# Patient Record
Sex: FEMALE | Race: WHITE
[De-identification: names, ages, dates, MRNs, and addresses within clinical notes are randomized per-mention and may not be internally consistent; named-entity substitution may affect disease eponyms.]

---

## 2017-02-13 ENCOUNTER — HOSPITAL ENCOUNTER (OUTPATIENT)
Dept: HOSPITAL 80 - BMCIMAGING | Age: 67
End: 2017-02-13
Attending: GENERAL ACUTE CARE HOSPITAL
Payer: COMMERCIAL

## 2017-02-13 DIAGNOSIS — Z80.3: ICD-10-CM

## 2017-02-13 DIAGNOSIS — Z12.31: Primary | ICD-10-CM

## 2017-02-13 NOTE — MA
Screening Digital Mammogram With iCAD Analysis



Clinical Indications: Routine screening. Her mother was diagnosed with breast cancer in her 80s and a
n aunt in her 60s. 



Technique: Standard cephalocaudal and mediolateral oblique projections were obtained. This examinatio
n was processed by the iCAD computer aided detection system.



Comparison: February 2016, February 2015, February 2014, January 2013, January 2012, November 2010, N
ovember 2009.



Breast density: Type C; Heterogeneously dense.



Findings: CAD was reviewed.  No masses, suspicious calcifications or other signs of malignancy are id
entified.  There has been no significant change in the appearance of either breast.



Impression: Negative mammogram. BI-RADS 1. 



Recommendation: Annual screening as long as physical examination is negative in this patient with het
erogeneously dense breasts.



[facility] will send a result letter to the patient.



Dense breast parenchyma diminishes mammographic sensitivity.

Negative mammography should not preclude additional workup of a clinically suspicious finding.

The patient's information is entered into a reminder system with a target due date for her next mammo
gram.

## 2018-02-19 ENCOUNTER — HOSPITAL ENCOUNTER (OUTPATIENT)
Dept: HOSPITAL 80 - BMCIMAGING | Age: 68
End: 2018-02-19
Attending: INTERNAL MEDICINE
Payer: COMMERCIAL

## 2018-02-19 DIAGNOSIS — Z12.31: Primary | ICD-10-CM

## 2018-02-19 DIAGNOSIS — Z80.3: ICD-10-CM

## 2019-02-25 ENCOUNTER — HOSPITAL ENCOUNTER (OUTPATIENT)
Dept: HOSPITAL 80 - FIMAGING | Age: 69
End: 2019-02-25
Attending: INTERNAL MEDICINE
Payer: COMMERCIAL

## 2019-02-25 DIAGNOSIS — Z80.3: ICD-10-CM

## 2019-02-25 DIAGNOSIS — Z12.31: Primary | ICD-10-CM

## 2019-05-23 ENCOUNTER — HOSPITAL ENCOUNTER (OUTPATIENT)
Dept: HOSPITAL 80 - BMCIMAGING | Age: 69
LOS: 12 days | End: 2019-06-04
Payer: COMMERCIAL